# Patient Record
Sex: MALE | Race: WHITE | Employment: OTHER | URBAN - NONMETROPOLITAN AREA
[De-identification: names, ages, dates, MRNs, and addresses within clinical notes are randomized per-mention and may not be internally consistent; named-entity substitution may affect disease eponyms.]

---

## 2018-08-09 ENCOUNTER — OFFICE VISIT (OUTPATIENT)
Dept: CARDIOLOGY CLINIC | Age: 58
End: 2018-08-09
Payer: COMMERCIAL

## 2018-08-09 VITALS
HEIGHT: 70 IN | WEIGHT: 175 LBS | SYSTOLIC BLOOD PRESSURE: 110 MMHG | HEART RATE: 88 BPM | BODY MASS INDEX: 25.05 KG/M2 | DIASTOLIC BLOOD PRESSURE: 70 MMHG

## 2018-08-09 DIAGNOSIS — Z71.6 TOBACCO ABUSE COUNSELING: ICD-10-CM

## 2018-08-09 DIAGNOSIS — R94.39 ABNORMAL NUCLEAR STRESS TEST: Primary | ICD-10-CM

## 2018-08-09 PROCEDURE — 99203 OFFICE O/P NEW LOW 30 MIN: CPT | Performed by: INTERNAL MEDICINE

## 2018-08-09 RX ORDER — NITROGLYCERIN 0.4 MG/1
0.4 TABLET SUBLINGUAL PRN
COMMUNITY
Start: 2018-08-06 | End: 2019-08-06

## 2018-08-09 RX ORDER — ZOLPIDEM TARTRATE 5 MG/1
1 TABLET ORAL PRN
COMMUNITY
Start: 2018-04-09

## 2018-08-09 RX ORDER — SIMVASTATIN 40 MG
1 TABLET ORAL NIGHTLY
COMMUNITY
Start: 2018-04-09 | End: 2018-09-06 | Stop reason: SDUPTHER

## 2018-08-09 RX ORDER — TRIAMCINOLONE ACETONIDE 5 MG/G
CREAM TOPICAL PRN
COMMUNITY
Start: 2018-04-09

## 2018-08-09 RX ORDER — VITAMIN E 268 MG
400 CAPSULE ORAL DAILY
COMMUNITY

## 2018-08-09 RX ORDER — ACETAMINOPHEN 160 MG
1 TABLET,DISINTEGRATING ORAL DAILY
COMMUNITY

## 2018-08-09 ASSESSMENT — PATIENT HEALTH QUESTIONNAIRE - PHQ9
1. LITTLE INTEREST OR PLEASURE IN DOING THINGS: 0
2. FEELING DOWN, DEPRESSED OR HOPELESS: 0
SUM OF ALL RESPONSES TO PHQ QUESTIONS 1-9: 0
SUM OF ALL RESPONSES TO PHQ9 QUESTIONS 1 & 2: 0
SUM OF ALL RESPONSES TO PHQ QUESTIONS 1-9: 0

## 2018-08-09 NOTE — PROGRESS NOTES
rate and rhythm, S1, S2 normal, no murmur, click, rub or gallop  Abdomen: soft, non-tender; bowel sounds normal; no masses,  no organomegaly  Extremities: extremities normal, atraumatic, no cyanosis or edema  Neurologic: Mental status: Alert, oriented, thought content appropriate      EKG: Normal Sinus Rhythm with no ischemic changes. LAST ECHO:    LAST STRESS: Done at [facility] on 8/6/2018  Stress test showed a medium-sized defect of moderate severity that is reversible in the basilar inferior wall suggesting ischemia. Ejection fracture was 63%. LAST CATH:      Past Medical and Surgical History, Problem List, Allergies, Medications, Labs, Imaging, all reviewed extensively in EMR and with the patient. Assessment and Plan:    1. Chest pain and URIOSTEGUI with abnormal stress. Will plan for cardiac cath. Risks  2. Tobacco abuse. Chronic. Discussed extensively and gave options to help with quitting. 3. Likely COPD- has not been diagnosed. Has exp wheezing. 4. Counseled to be compliant with all physician visits, physician recommendations, and medication compliance. 5. Answered all questions. 6. Discussed all medications and possible side effects. 7. Educational materials provided. 8. Follow up appointments set. Thank you for allowing me to participate in the care of this patient, please do not hesitate to call if you have any questions. Grzegorz Isidro, [id number] [facility]  [facility]. [facility]  [phone]

## 2018-08-15 ENCOUNTER — HOSPITAL ENCOUNTER (OUTPATIENT)
Dept: CARDIAC CATH/INVASIVE PROCEDURES | Age: 58
Discharge: HOME | End: 2018-08-15
Payer: COMMERCIAL

## 2018-08-15 VITALS
HEIGHT: 70 IN | TEMPERATURE: 98.2 F | WEIGHT: 175 LBS | OXYGEN SATURATION: 94 % | DIASTOLIC BLOOD PRESSURE: 74 MMHG | BODY MASS INDEX: 25.05 KG/M2 | SYSTOLIC BLOOD PRESSURE: 148 MMHG | RESPIRATION RATE: 23 BRPM | HEART RATE: 90 BPM

## 2018-08-15 LAB
ACTIVATED CLOTTING TIME: 160 SEC (ref 79–149)
GFR NON-AFRICAN AMERICAN: >60 ML/MIN
GFR SERPL CREATININE-BSD FRML MDRD: >60 ML/MIN
GFR SERPL CREATININE-BSD FRML MDRD: NORMAL ML/MIN/{1.73_M2}
GLUCOSE BLD-MCNC: 100 MG/DL (ref 74–100)
PLATELET # BLD: 206 K/UL (ref 138–453)
POC CHLORIDE: 107 MMOL/L (ref 98–107)
POC CREATININE: 0.63 MG/DL (ref 0.51–1.19)
POC HEMATOCRIT: 55 % (ref 41–53)
POC HEMOGLOBIN: 18.6 G/DL (ref 13.5–17.5)
POC IONIZED CALCIUM: 1.29 MMOL/L (ref 1.15–1.33)
POC POTASSIUM: 4.4 MMOL/L (ref 3.5–4.5)
POC SODIUM: 140 MMOL/L (ref 138–146)

## 2018-08-15 PROCEDURE — 6360000004 HC RX CONTRAST MEDICATION

## 2018-08-15 PROCEDURE — C1887 CATHETER, GUIDING: HCPCS

## 2018-08-15 PROCEDURE — C1894 INTRO/SHEATH, NON-LASER: HCPCS

## 2018-08-15 PROCEDURE — 93458 L HRT ARTERY/VENTRICLE ANGIO: CPT | Performed by: INTERNAL MEDICINE

## 2018-08-15 PROCEDURE — 85049 AUTOMATED PLATELET COUNT: CPT

## 2018-08-15 PROCEDURE — 82947 ASSAY GLUCOSE BLOOD QUANT: CPT

## 2018-08-15 PROCEDURE — 85014 HEMATOCRIT: CPT

## 2018-08-15 PROCEDURE — 82565 ASSAY OF CREATININE: CPT

## 2018-08-15 PROCEDURE — 84295 ASSAY OF SERUM SODIUM: CPT

## 2018-08-15 PROCEDURE — C1769 GUIDE WIRE: HCPCS

## 2018-08-15 PROCEDURE — 2709999900 HC NON-CHARGEABLE SUPPLY

## 2018-08-15 PROCEDURE — 6360000002 HC RX W HCPCS

## 2018-08-15 PROCEDURE — 84132 ASSAY OF SERUM POTASSIUM: CPT

## 2018-08-15 PROCEDURE — 82435 ASSAY OF BLOOD CHLORIDE: CPT

## 2018-08-15 PROCEDURE — 82330 ASSAY OF CALCIUM: CPT

## 2018-08-15 PROCEDURE — 93571 IV DOP VEL&/PRESS C FLO 1ST: CPT | Performed by: INTERNAL MEDICINE

## 2018-08-15 PROCEDURE — 85347 COAGULATION TIME ACTIVATED: CPT

## 2018-08-15 RX ORDER — SODIUM CHLORIDE 0.9 % (FLUSH) 0.9 %
10 SYRINGE (ML) INJECTION EVERY 12 HOURS SCHEDULED
Status: CANCELLED | OUTPATIENT
Start: 2018-08-15

## 2018-08-15 RX ORDER — ISOSORBIDE MONONITRATE 30 MG/1
30 TABLET, EXTENDED RELEASE ORAL EVERY MORNING
Qty: 30 TABLET | Refills: 3 | Status: SHIPPED | OUTPATIENT
Start: 2018-08-15 | End: 2018-09-06 | Stop reason: SDUPTHER

## 2018-08-15 RX ORDER — ONDANSETRON 2 MG/ML
4 INJECTION INTRAMUSCULAR; INTRAVENOUS EVERY 6 HOURS PRN
Status: CANCELLED | OUTPATIENT
Start: 2018-08-15

## 2018-08-15 RX ORDER — SODIUM CHLORIDE 9 MG/ML
INJECTION, SOLUTION INTRAVENOUS CONTINUOUS
Status: DISCONTINUED | OUTPATIENT
Start: 2018-08-15 | End: 2018-08-16 | Stop reason: HOSPADM

## 2018-08-15 RX ORDER — SODIUM CHLORIDE 0.9 % (FLUSH) 0.9 %
10 SYRINGE (ML) INJECTION PRN
Status: CANCELLED | OUTPATIENT
Start: 2018-08-15

## 2018-08-15 RX ORDER — ACETAMINOPHEN 325 MG/1
650 TABLET ORAL EVERY 4 HOURS PRN
Status: CANCELLED | OUTPATIENT
Start: 2018-08-15

## 2018-08-15 RX ORDER — AMLODIPINE BESYLATE 2.5 MG/1
5 TABLET ORAL NIGHTLY
Qty: 30 TABLET | Refills: 3 | Status: SHIPPED | OUTPATIENT
Start: 2018-08-15 | End: 2018-09-06 | Stop reason: SDUPTHER

## 2018-08-15 RX ORDER — SODIUM CHLORIDE 9 MG/ML
INJECTION, SOLUTION INTRAVENOUS CONTINUOUS
Status: CANCELLED | OUTPATIENT
Start: 2018-08-15 | End: 2018-08-18

## 2018-08-15 RX ADMIN — SODIUM CHLORIDE: 9 INJECTION, SOLUTION INTRAVENOUS at 13:47

## 2018-08-15 NOTE — H&P
[facility]   Admission Note                 Date:   8/15/2018  Patient name: Ema Armstrong  Date of admission:  No admission date for patient encounter. MRN:   [id number]  YOB: 1960    Reason for Admission: Coronary angiography     CHIEF COMPLAINT:  61 y.o. y/o male presented with exertional chest tightness. Stress test was abnormal on 8/6/18 (medium-sized defect of moderate severity that is reversible in the basilar inferior wall suggesting ischemia. Ejection fracture was 63%). [X] Please see the original hard copy H&P by Dr Kori Tidwell from 8/9/18 in the short chart. [X]  No changes in the information from the time of evaluation      Past Medical History:   has a past medical history of Hyperlipidemia and Hypertension. Past Surgical History:   has a past surgical history that includes back surgery (1994) and Appendectomy (1977). Home Medications:    Prior to Admission medications    Medication Sig Start Date End Date Taking? Authorizing Provider   metoprolol tartrate (LOPRESSOR) 25 MG tablet Take 25 mg by mouth 2 times daily 8/6/18   Historical Provider, MD   nitroGLYCERIN (NITROSTAT) 0.4 MG SL tablet Place 0.4 mg under the tongue as needed 8/6/18 8/6/19  Historical Provider, MD   simvastatin (ZOCOR) 40 MG tablet Take 1 tablet by mouth nightly 4/9/18   Historical Provider, MD   zolpidem (AMBIEN) 5 MG tablet Take 1 tablet by mouth as needed. . 4/9/18   Historical Provider, MD   triamcinolone (ARISTOCORT) 0.5 % cream Apply topically as needed 4/9/18   Historical Provider, MD   KRILL OIL OMEGA-3 PO Take 1 capsule by mouth daily    Historical Provider, MD   Cholecalciferol (VITAMIN D3) 2000 units CAPS Take 1 capsule by mouth daily    Historical Provider, MD   vitamin E 400 UNIT capsule Take 400 Units by mouth daily    Historical Provider, MD       Allergies:  Patient has no known allergies. Social History:   reports that he has been smoking Cigarettes.   He has a 80.00 pack-year smoking history. He has never used smokeless tobacco. He reports that he drinks about 14.4 oz of alcohol per week . He reports that he does not use drugs. Family History:   Negative for early CAD    REVIEW OF SYSTEMS:    · As above or as detailed in office note     PHYSICAL EXAM:     There were no vitals taken for this visit. Constitutional and General Appearance: alert, cooperative, in no distress   HEENT: PERRL, no cervical lymphadenopathy. Normal oral mucosa  Respiratory:  · No increased work of breathing. No use of accessory resp muscles  · On auscultation: clear to auscultation bilaterally, no wheezing, no rales. Cardiovascular:  · Jugular venous pulsation Normal  · Thre is no carotid bruit   · Regular rate and rhythm with normal S1 and S2.   · Murmurs: None   · Peripheral pulses are symmetrical and full   Abdomen:  · No masses or tenderness  · Bowel sounds present  Extremities:  ·  No Cyanosis or Clubbing  ·  Lower extremity edema: No edema   ·  Skin: Warm and dry  Neurological:  · Not done      Labs:     CBC: No results for input(s): WBC, HGB, HCT, PLT in the last 72 hours. BMP: No results for input(s): NA, K, CO2, BUN, CREATININE, LABGLOM, GLUCOSE in the last 72 hours. BNP: No results for input(s): BNP in the last 72 hours. PT/INR: No results for input(s): PROTIME, INR in the last 72 hours. APTT:No results for input(s): APTT in the last 72 hours. CARDIAC ENZYMES:No results for input(s): CKTOTAL, CKMB, CKMBINDEX, TROPONINI in the last 72 hours. FASTING LIPID PANEL:No results found for: HDL, LDLDIRECT, LDLCALC, TRIG  LIVER PROFILE:No results for input(s): AST, ALT, LABALBU in the last 72 hours. IMPRESSION:    1. Exertional chest tightness with abnormal stress test on 8/6/18 (medium-sized defect of moderate severity that is reversible in the basilar inferior wall suggesting ischemia. Ejection fracture was 63%). 2. Active smoker  3. COPD  4.  Dyslipidemia    Patient Active Problem List

## 2018-08-15 NOTE — PROGRESS NOTES
Art line DC'D at 16:07 Pressure held x 15 minutes. No hematoma, bleeding or bruising noted. Bandaid applied.

## 2018-09-06 ENCOUNTER — OFFICE VISIT (OUTPATIENT)
Dept: CARDIOLOGY CLINIC | Age: 58
End: 2018-09-06
Payer: COMMERCIAL

## 2018-09-06 VITALS
SYSTOLIC BLOOD PRESSURE: 132 MMHG | HEART RATE: 70 BPM | WEIGHT: 190 LBS | HEIGHT: 70 IN | BODY MASS INDEX: 27.2 KG/M2 | RESPIRATION RATE: 18 BRPM | DIASTOLIC BLOOD PRESSURE: 70 MMHG

## 2018-09-06 DIAGNOSIS — I25.118 CORONARY ARTERY DISEASE INVOLVING NATIVE CORONARY ARTERY OF NATIVE HEART WITH OTHER FORM OF ANGINA PECTORIS (HCC): Primary | ICD-10-CM

## 2018-09-06 PROCEDURE — 99213 OFFICE O/P EST LOW 20 MIN: CPT | Performed by: INTERNAL MEDICINE

## 2018-09-06 RX ORDER — AMLODIPINE BESYLATE 5 MG/1
5 TABLET ORAL NIGHTLY
Qty: 90 TABLET | Refills: 3 | Status: SHIPPED | OUTPATIENT
Start: 2018-09-06 | End: 2018-12-06 | Stop reason: SDUPTHER

## 2018-09-06 RX ORDER — SIMVASTATIN 40 MG
40 TABLET ORAL NIGHTLY
Qty: 90 TABLET | Refills: 5 | Status: SHIPPED | OUTPATIENT
Start: 2018-09-06 | End: 2018-12-06 | Stop reason: SDUPTHER

## 2018-09-06 RX ORDER — ISOSORBIDE MONONITRATE 30 MG/1
30 TABLET, EXTENDED RELEASE ORAL EVERY MORNING
Qty: 90 TABLET | Refills: 3 | Status: SHIPPED | OUTPATIENT
Start: 2018-09-06 | End: 2018-12-06 | Stop reason: SDUPTHER

## 2018-09-06 NOTE — PROGRESS NOTES
Patient has no known allergies. Social History:   reports that he has been smoking Cigarettes. He has a 80.00 pack-year smoking history. He has never used smokeless tobacco. He reports that he drinks about 14.4 oz of alcohol per week . He reports that he does not use drugs. REVIEW OF SYSTEMS:  CONSTITUTIONAL:NEGATIVE  HEENT:NEG  Cardiovascular: No chest pain, No dyspnea on exertion, No palpitations. Lower extremity edema: No  RESPIRATORY: URIOSTEGUI  GASTROINTESTINAL:  negative  GENITOURINARY:  negative  INTEGUMENT:  negative  MUSCULOSKELETAL:  positive for  pain  NEUROLOGICAL:  negative    PHYSICAL EXAM:      /70 (Site: Left Arm, Position: Sitting, Cuff Size: Medium Adult)   Pulse 70   Resp 18   Ht 5' 10\" (1.778 m)   Wt 190 lb (86.2 kg)   BMI 27.26 kg/m²    HEENT: PERRL, no cervical lymphadenopathy. No masses palpable. Cardiovascular:  · The apical impulse is not displaced  · Heart  Sounds:RRR, NO S3/RUB  · Jugular venous pulsation Normal  · The carotid upstroke is NL  · Peripheral pulses are symmetrical and full  Respiratory: Good respiratory effort. On auscultation: clear to auscultation bilaterally  Abdomen:  · No masses or tenderness  · Bowel sounds present  Extremities:  ·  No Cyanosis or Clubbing  ·  Lower extremity edema: No  Skin: Warm and dry    Cardiac data:    Cardiac cath 8/15/2018:  Procedure Summary      Moderate disease of OM1 with non-signficant IFR. Mild coronary artery   disease otherwise. Evidence of endothelial dysfunction.   Normal LV systolic function.       Labs:     CBC: No results for input(s): WBC, HGB, HCT, PLT in the last 72 hours. BMP: No results for input(s): NA, K, CO2, BUN, CREATININE, LABGLOM, GLUCOSE in the last 72 hours. PT/INR: No results for input(s): PROTIME, INR in the last 72 hours. FASTING LIPID PANEL:No results found for: HDL, LDLDIRECT, LDLCALC, TRIG  LIVER PROFILE:No results for input(s): AST, ALT, LABALBU in the last 72 hours.     IMPRESSION: NONOBSTRUCTIVE CAD, CARDIAC CATH 8/15/2018 with coronary spasm and endothelial dysfunction  PRESERVED LV SYSTOLIC FUNCTION  DYSPNEA  REFORMED SMOKER, QUIT FEW WEEKS AGO  HLP  Patient Active Problem List   Diagnosis    Tobacco abuse counseling       RECOMMENDATIONS:  INCREASE AMLODIPINE TO 5 MG QD  REGULAR EXERCISE  CALORIE RESTRICTION  WEIGHT LOSS  CONTINUE CURRENT TREATMENT    RTC 3  MONTHS          Ana Ricketts MD  [facility]           [phone]

## 2018-12-06 ENCOUNTER — OFFICE VISIT (OUTPATIENT)
Dept: CARDIOLOGY CLINIC | Age: 58
End: 2018-12-06

## 2018-12-06 VITALS
HEART RATE: 92 BPM | SYSTOLIC BLOOD PRESSURE: 139 MMHG | DIASTOLIC BLOOD PRESSURE: 72 MMHG | WEIGHT: 193 LBS | BODY MASS INDEX: 27.63 KG/M2 | HEIGHT: 70 IN

## 2018-12-06 DIAGNOSIS — E78.2 MIXED HYPERLIPIDEMIA: ICD-10-CM

## 2018-12-06 DIAGNOSIS — J44.9 CHRONIC OBSTRUCTIVE PULMONARY DISEASE, UNSPECIFIED COPD TYPE (HCC): ICD-10-CM

## 2018-12-06 DIAGNOSIS — Z71.6 TOBACCO ABUSE COUNSELING: Primary | ICD-10-CM

## 2018-12-06 DIAGNOSIS — I10 ESSENTIAL HYPERTENSION: ICD-10-CM

## 2018-12-06 DIAGNOSIS — I20.1 CORONARY VASOSPASM (HCC): ICD-10-CM

## 2018-12-06 DIAGNOSIS — R07.89 OTHER CHEST PAIN: ICD-10-CM

## 2018-12-06 PROCEDURE — 99213 OFFICE O/P EST LOW 20 MIN: CPT | Performed by: INTERNAL MEDICINE

## 2018-12-06 RX ORDER — METOPROLOL TARTRATE 50 MG/1
50 TABLET, FILM COATED ORAL 2 TIMES DAILY
Qty: 180 TABLET | Refills: 3 | Status: SHIPPED | OUTPATIENT
Start: 2018-12-06 | End: 2019-01-31 | Stop reason: SDUPTHER

## 2018-12-06 RX ORDER — AMLODIPINE BESYLATE 10 MG/1
10 TABLET ORAL DAILY
Qty: 90 TABLET | Refills: 3 | Status: SHIPPED | OUTPATIENT
Start: 2018-12-06

## 2018-12-06 RX ORDER — SIMVASTATIN 40 MG
40 TABLET ORAL NIGHTLY
Qty: 90 TABLET | Refills: 5 | Status: SHIPPED | OUTPATIENT
Start: 2018-12-06

## 2018-12-06 RX ORDER — ISOSORBIDE MONONITRATE 60 MG/1
60 TABLET, EXTENDED RELEASE ORAL EVERY MORNING
Qty: 90 TABLET | Refills: 3 | Status: SHIPPED | OUTPATIENT
Start: 2018-12-06

## 2019-01-31 ENCOUNTER — OFFICE VISIT (OUTPATIENT)
Dept: CARDIOLOGY CLINIC | Age: 59
End: 2019-01-31

## 2019-01-31 VITALS — HEART RATE: 70 BPM | WEIGHT: 201.8 LBS | HEIGHT: 70 IN | BODY MASS INDEX: 28.89 KG/M2

## 2019-01-31 DIAGNOSIS — I10 ESSENTIAL HYPERTENSION: ICD-10-CM

## 2019-01-31 DIAGNOSIS — J44.9 CHRONIC OBSTRUCTIVE PULMONARY DISEASE, UNSPECIFIED COPD TYPE (HCC): ICD-10-CM

## 2019-01-31 DIAGNOSIS — I20.1 CORONARY VASOSPASM (HCC): Primary | ICD-10-CM

## 2019-01-31 DIAGNOSIS — Z71.6 TOBACCO ABUSE COUNSELING: ICD-10-CM

## 2019-01-31 DIAGNOSIS — E78.2 MIXED HYPERLIPIDEMIA: ICD-10-CM

## 2019-01-31 PROCEDURE — 99213 OFFICE O/P EST LOW 20 MIN: CPT | Performed by: INTERNAL MEDICINE

## 2019-01-31 RX ORDER — TIZANIDINE 4 MG/1
4 TABLET ORAL
COMMUNITY
Start: 2019-01-17

## 2019-01-31 RX ORDER — NAPROXEN 500 MG/1
TABLET ORAL
Refills: 0 | COMMUNITY
Start: 2019-01-17

## 2019-01-31 RX ORDER — METOPROLOL TARTRATE 75 MG/1
75 TABLET, FILM COATED ORAL 2 TIMES DAILY
Qty: 180 TABLET | Refills: 3 | Status: SHIPPED | OUTPATIENT
Start: 2019-01-31

## 2022-10-13 RX ORDER — METOPROLOL TARTRATE 75 MG/1
75 TABLET, FILM COATED ORAL 2 TIMES DAILY
Qty: 180 TABLET | Refills: 3 | OUTPATIENT
Start: 2022-10-13

## 2022-10-13 NOTE — TELEPHONE ENCOUNTER
Last Appt:  Visit date not found  Next Appt:   Visit date not found    [facility]